# Patient Record
Sex: MALE | URBAN - METROPOLITAN AREA
[De-identification: names, ages, dates, MRNs, and addresses within clinical notes are randomized per-mention and may not be internally consistent; named-entity substitution may affect disease eponyms.]

---

## 2021-02-02 ENCOUNTER — APPOINTMENT (RX ONLY)
Dept: URBAN - METROPOLITAN AREA CLINIC 87 | Facility: CLINIC | Age: 66
Setting detail: DERMATOLOGY
End: 2021-02-02

## 2021-02-02 DIAGNOSIS — A63.0 ANOGENITAL (VENEREAL) WARTS: ICD-10-CM | Status: INADEQUATELY CONTROLLED

## 2021-02-02 PROCEDURE — ? LIQUID NITROGEN GENITALS MULTI

## 2021-02-02 PROCEDURE — ? COUNSELING

## 2021-02-02 PROCEDURE — ? PRESCRIPTION

## 2021-02-02 PROCEDURE — 99203 OFFICE O/P NEW LOW 30 MIN: CPT | Mod: 25

## 2021-02-02 PROCEDURE — ? SEPARATE AND IDENTIFIABLE DOCUMENTATION

## 2021-02-02 PROCEDURE — ? TREATMENT REGIMEN

## 2021-02-02 PROCEDURE — 17110 DESTRUCTION B9 LES UP TO 14: CPT

## 2021-02-02 RX ORDER — IMIQUIMOD 12.5 MG/.25G
CREAM TOPICAL
Qty: 1 | Refills: 1 | Status: ERX | COMMUNITY
Start: 2021-02-02

## 2021-02-02 RX ADMIN — IMIQUIMOD: 12.5 CREAM TOPICAL at 00:00

## 2021-02-02 ASSESSMENT — LOCATION ZONE DERM
LOCATION ZONE: LEG
LOCATION ZONE: GENITALIA

## 2021-02-02 ASSESSMENT — LOCATION DETAILED DESCRIPTION DERM
LOCATION DETAILED: RIGHT ANTERIOR SCROTUM
LOCATION DETAILED: LEFT ANTERIOR SCROTUM
LOCATION DETAILED: LEFT ANTERIOR PROXIMAL THIGH

## 2021-02-02 ASSESSMENT — LOCATION SIMPLE DESCRIPTION DERM
LOCATION SIMPLE: LEFT THIGH
LOCATION SIMPLE: SCROTUM

## 2021-02-02 NOTE — PROCEDURE: TREATMENT REGIMEN
Detail Level: Zone
Plan: LN2 today. At home treatment with Imiquimod 5% cream qohs x 2 weeks. I explained that Imiquimod can sometimes lead to a strong inflammatory reaction. If this happens, I recommended that the pt stop the imiquimod and can use OTC HC cream.

## 2021-02-02 NOTE — PROCEDURE: LIQUID NITROGEN GENITALS MULTI
Post-Care Instructions: I reviewed with the patient in detail post-care instructions. Patient is to wear sunprotection, and avoid picking at any of the treated lesions. Pt may apply Vaseline to crusted or scabbing areas.
Render Post-Care Instructions In Note?: no
Detail Level: Zone
Consent: The patient's consent was obtained including but not limited to risks of crusting, scabbing, blistering, scarring, darker or lighter pigmentary change, recurrence, incomplete removal and infection.
Total Number Of Lesions Treated: 6

## 2021-03-09 ENCOUNTER — APPOINTMENT (RX ONLY)
Dept: URBAN - METROPOLITAN AREA CLINIC 87 | Facility: CLINIC | Age: 66
Setting detail: DERMATOLOGY
End: 2021-03-09

## 2021-03-09 DIAGNOSIS — T07XXXA ABRASION OR FRICTION BURN OF OTHER, MULTIPLE, AND UNSPECIFIED SITES, WITHOUT MENTION OF INFECTION: ICD-10-CM

## 2021-03-09 DIAGNOSIS — A63.0 ANOGENITAL (VENEREAL) WARTS: ICD-10-CM

## 2021-03-09 PROBLEM — S50.312A ABRASION OF LEFT ELBOW, INITIAL ENCOUNTER: Status: ACTIVE | Noted: 2021-03-09

## 2021-03-09 PROBLEM — S80.212A ABRASION, LEFT KNEE, INITIAL ENCOUNTER: Status: ACTIVE | Noted: 2021-03-09

## 2021-03-09 PROBLEM — S80.812A ABRASION, LEFT LOWER LEG, INITIAL ENCOUNTER: Status: ACTIVE | Noted: 2021-03-09

## 2021-03-09 PROCEDURE — ? TREATMENT REGIMEN

## 2021-03-09 PROCEDURE — 99213 OFFICE O/P EST LOW 20 MIN: CPT | Mod: 25

## 2021-03-09 PROCEDURE — 54056 CRYOSURGERY PENIS LESION(S): CPT

## 2021-03-09 PROCEDURE — ? PRESCRIPTION

## 2021-03-09 PROCEDURE — ? COUNSELING

## 2021-03-09 PROCEDURE — ? LIQUID NITROGEN GENITALS MULTI

## 2021-03-09 RX ORDER — IMIQUIMOD 12.5 MG/.25G
CREAM TOPICAL
Qty: 1 | Refills: 1 | Status: ERX

## 2021-03-09 RX ORDER — MUPIROCIN 20 MG/G
OINTMENT TOPICAL
Qty: 1 | Refills: 3 | Status: ERX | COMMUNITY
Start: 2021-03-09

## 2021-03-09 RX ADMIN — MUPIROCIN: 20 OINTMENT TOPICAL at 00:00

## 2021-03-09 ASSESSMENT — LOCATION ZONE DERM
LOCATION ZONE: LEG
LOCATION ZONE: PENIS
LOCATION ZONE: GENITALIA
LOCATION ZONE: ARM
LOCATION ZONE: TRUNK

## 2021-03-09 ASSESSMENT — LOCATION DETAILED DESCRIPTION DERM
LOCATION DETAILED: LEFT SCROTUM
LOCATION DETAILED: RIGHT SCROTUM
LOCATION DETAILED: LEFT INGUINAL CREASE
LOCATION DETAILED: LEFT DISTAL PRETIBIAL REGION
LOCATION DETAILED: PERINEUM
LOCATION DETAILED: VENTRAL PENILE SHAFT
LOCATION DETAILED: LEFT KNEE
LOCATION DETAILED: LEFT ELBOW

## 2021-03-09 ASSESSMENT — LOCATION SIMPLE DESCRIPTION DERM
LOCATION SIMPLE: LEFT KNEE
LOCATION SIMPLE: SCROTUM
LOCATION SIMPLE: LEFT ELBOW
LOCATION SIMPLE: PENIS
LOCATION SIMPLE: GROIN
LOCATION SIMPLE: PERINEUM
LOCATION SIMPLE: LEFT PRETIBIAL REGION

## 2021-03-09 NOTE — PROCEDURE: LIQUID NITROGEN GENITALS MULTI
Detail Level: Zone
Consent: The patient's consent was obtained including but not limited to risks of crusting, scabbing, blistering, scarring, darker or lighter pigmentary change, recurrence, incomplete removal and infection.
Total Number Of Lesions Treated: 6
Post-Care Instructions: I reviewed with the patient in detail post-care instructions. Patient is to wear sunprotection, and avoid picking at any of the treated lesions. Pt may apply Vaseline to crusted or scabbing areas.
Render Post-Care Instructions In Note?: no

## 2021-03-09 NOTE — PROCEDURE: TREATMENT REGIMEN
Plan: Pt reports that he has received his first dose of the HPV vaccine today.
Continue Regimen: At home treatment with Imiquimod 5% cream qohs x 2 weeks.
Detail Level: Zone

## 2021-04-08 ENCOUNTER — APPOINTMENT (RX ONLY)
Dept: URBAN - METROPOLITAN AREA CLINIC 87 | Facility: CLINIC | Age: 66
Setting detail: DERMATOLOGY
End: 2021-04-08

## 2021-04-08 DIAGNOSIS — A63.0 ANOGENITAL (VENEREAL) WARTS: ICD-10-CM

## 2021-04-08 PROCEDURE — ? TREATMENT REGIMEN

## 2021-04-08 PROCEDURE — ? COUNSELING

## 2021-04-08 PROCEDURE — ? LIQUID NITROGEN GENITALS MULTI

## 2021-04-08 PROCEDURE — 54056 CRYOSURGERY PENIS LESION(S): CPT

## 2021-04-08 ASSESSMENT — LOCATION ZONE DERM
LOCATION ZONE: TRUNK
LOCATION ZONE: GENITALIA
LOCATION ZONE: PENIS

## 2021-04-08 ASSESSMENT — LOCATION SIMPLE DESCRIPTION DERM
LOCATION SIMPLE: SCROTUM
LOCATION SIMPLE: PERINEUM
LOCATION SIMPLE: GROIN
LOCATION SIMPLE: PENIS

## 2021-04-08 ASSESSMENT — LOCATION DETAILED DESCRIPTION DERM
LOCATION DETAILED: PERINEUM
LOCATION DETAILED: LEFT SCROTUM
LOCATION DETAILED: RIGHT SCROTUM
LOCATION DETAILED: VENTRAL PENILE SHAFT
LOCATION DETAILED: LEFT INGUINAL CREASE

## 2021-04-08 NOTE — PROCEDURE: TREATMENT REGIMEN
Detail Level: Zone
Continue Regimen: At home treatment with Imiquimod 5% cream qohs x 2 weeks.
Plan: Advised patient to continue using imiqumod and re-start application 2 days after LN2 treatment.

## 2021-05-06 ENCOUNTER — RX ONLY (OUTPATIENT)
Age: 66
Setting detail: RX ONLY
End: 2021-05-06

## 2021-05-06 ENCOUNTER — APPOINTMENT (RX ONLY)
Dept: URBAN - METROPOLITAN AREA CLINIC 87 | Facility: CLINIC | Age: 66
Setting detail: DERMATOLOGY
End: 2021-05-06

## 2021-05-06 DIAGNOSIS — A63.0 ANOGENITAL (VENEREAL) WARTS: ICD-10-CM

## 2021-05-06 PROCEDURE — ? COUNSELING

## 2021-05-06 PROCEDURE — 54056 CRYOSURGERY PENIS LESION(S): CPT

## 2021-05-06 PROCEDURE — ? LIQUID NITROGEN GENITALS MULTI

## 2021-05-06 PROCEDURE — ? TREATMENT REGIMEN

## 2021-05-06 RX ORDER — IMIQUIMOD 12.5 MG/.25G
CREAM TOPICAL
Qty: 1 | Refills: 1 | Status: ERX

## 2021-05-06 ASSESSMENT — LOCATION DETAILED DESCRIPTION DERM
LOCATION DETAILED: RIGHT SCROTUM
LOCATION DETAILED: VENTRAL PENILE SHAFT
LOCATION DETAILED: LEFT INGUINAL CREASE
LOCATION DETAILED: LEFT SCROTUM

## 2021-05-06 ASSESSMENT — LOCATION SIMPLE DESCRIPTION DERM
LOCATION SIMPLE: PENIS
LOCATION SIMPLE: SCROTUM
LOCATION SIMPLE: GROIN

## 2021-05-06 ASSESSMENT — LOCATION ZONE DERM
LOCATION ZONE: PENIS
LOCATION ZONE: TRUNK
LOCATION ZONE: GENITALIA

## 2021-05-06 NOTE — PROCEDURE: LIQUID NITROGEN GENITALS MULTI
Post-Care Instructions: I reviewed with the patient in detail post-care instructions. Patient is to wear sunprotection, and avoid picking at any of the treated lesions. Pt may apply Vaseline to crusted or scabbing areas.
Render Post-Care Instructions In Note?: no
Consent: The patient's consent was obtained including but not limited to risks of crusting, scabbing, blistering, scarring, darker or lighter pigmentary change, recurrence, incomplete removal and infection.
Detail Level: Zone
Total Number Of Lesions Treated: 3

## 2021-05-06 NOTE — PROCEDURE: TREATMENT REGIMEN
Plan: Advised patient to continue using imiqumod and re-start application 2 days after LN2 treatment.
Detail Level: Zone
Continue Regimen: At home treatment with Imiquimod 5% cream qohs x 2 weeks.

## 2023-03-01 ENCOUNTER — RX ONLY (OUTPATIENT)
Age: 68
Setting detail: RX ONLY
End: 2023-03-01

## 2023-03-01 RX ORDER — MUPIROCIN 20 MG/G
OINTMENT TOPICAL
Qty: 22 | Refills: 3 | Status: CANCELLED | COMMUNITY
Start: 2023-03-01

## 2023-03-01 RX ORDER — MUPIROCIN 20 MG/G
OINTMENT TOPICAL
Qty: 22 | Refills: 0 | Status: ERX | COMMUNITY
Start: 2023-03-01

## 2025-08-19 ENCOUNTER — APPOINTMENT (OUTPATIENT)
Dept: URBAN - METROPOLITAN AREA CLINIC 86 | Facility: CLINIC | Age: 70
Setting detail: DERMATOLOGY
End: 2025-08-19

## 2025-08-19 VITALS — HEIGHT: 66 IN | WEIGHT: 246 LBS

## 2025-08-19 DIAGNOSIS — A63.0 ANOGENITAL (VENEREAL) WARTS: ICD-10-CM

## 2025-08-19 DIAGNOSIS — L28.1 PRURIGO NODULARIS: ICD-10-CM

## 2025-08-19 PROBLEM — L30.9 DERMATITIS, UNSPECIFIED: Status: ACTIVE | Noted: 2025-08-19

## 2025-08-19 PROCEDURE — ? PRESCRIPTION

## 2025-08-19 PROCEDURE — ? LIQUID NITROGEN GENITALS

## 2025-08-19 PROCEDURE — ? COUNSELING

## 2025-08-19 PROCEDURE — ? TREATMENT REGIMEN

## 2025-08-19 PROCEDURE — ? PHOTO-DOCUMENTATION

## 2025-08-19 PROCEDURE — ? DEFER

## 2025-08-19 RX ORDER — MUPIROCIN 20 MG/G
OINTMENT TOPICAL
Qty: 22 | Refills: 1 | Status: ERX

## 2025-08-19 RX ORDER — TRIAMCINOLONE ACETONIDE 1 MG/G
CREAM TOPICAL BID
Qty: 30 | Refills: 1 | Status: ERX | COMMUNITY
Start: 2025-08-19

## 2025-08-19 RX ORDER — IMIQUIMOD 12.5 MG/.25G
CREAM TOPICAL
Qty: 12 | Refills: 0 | Status: ERX | COMMUNITY
Start: 2025-08-19

## 2025-08-19 RX ADMIN — IMIQUIMOD: 12.5 CREAM TOPICAL at 00:00

## 2025-08-19 RX ADMIN — TRIAMCINOLONE ACETONIDE: 1 CREAM TOPICAL at 00:00

## 2025-08-19 ASSESSMENT — LOCATION ZONE DERM
LOCATION ZONE: LEG
LOCATION ZONE: GENITALIA

## 2025-08-19 ASSESSMENT — LOCATION SIMPLE DESCRIPTION DERM
LOCATION SIMPLE: PERINEUM
LOCATION SIMPLE: RIGHT PRETIBIAL REGION

## 2025-08-19 ASSESSMENT — LOCATION DETAILED DESCRIPTION DERM
LOCATION DETAILED: PERINEUM
LOCATION DETAILED: RIGHT DISTAL PRETIBIAL REGION